# Patient Record
Sex: MALE | Race: WHITE
[De-identification: names, ages, dates, MRNs, and addresses within clinical notes are randomized per-mention and may not be internally consistent; named-entity substitution may affect disease eponyms.]

---

## 2021-01-08 ENCOUNTER — HOSPITAL ENCOUNTER (EMERGENCY)
Dept: HOSPITAL 56 - MW.ED | Age: 81
LOS: 1 days | Discharge: NURSING, REHAB OR LONG-TERM CARE | End: 2021-01-09
Payer: MEDICARE

## 2021-01-08 DIAGNOSIS — Z46.6: ICD-10-CM

## 2021-01-08 DIAGNOSIS — Z79.82: ICD-10-CM

## 2021-01-08 DIAGNOSIS — I48.91: ICD-10-CM

## 2021-01-08 DIAGNOSIS — R33.9: ICD-10-CM

## 2021-01-08 DIAGNOSIS — C49.9: ICD-10-CM

## 2021-01-08 DIAGNOSIS — A41.9: Primary | ICD-10-CM

## 2021-01-08 DIAGNOSIS — Z20.822: ICD-10-CM

## 2021-01-08 DIAGNOSIS — L03.114: ICD-10-CM

## 2021-01-08 DIAGNOSIS — I10: ICD-10-CM

## 2021-01-08 DIAGNOSIS — E88.09: ICD-10-CM

## 2021-01-08 LAB
BUN SERPL-MCNC: 47 MG/DL (ref 7–18)
CHLORIDE SERPL-SCNC: 108 MMOL/L (ref 98–107)
CO2 SERPL-SCNC: 27.1 MMOL/L (ref 21–32)
GLUCOSE SERPL-MCNC: 112 MG/DL (ref 74–106)
POTASSIUM SERPL-SCNC: 4.3 MMOL/L (ref 3.5–5.1)
SODIUM SERPL-SCNC: 145 MMOL/L (ref 136–148)

## 2021-01-08 PROCEDURE — 96365 THER/PROPH/DIAG IV INF INIT: CPT

## 2021-01-08 PROCEDURE — 96366 THER/PROPH/DIAG IV INF ADDON: CPT

## 2021-01-08 PROCEDURE — 83735 ASSAY OF MAGNESIUM: CPT

## 2021-01-08 PROCEDURE — 87040 BLOOD CULTURE FOR BACTERIA: CPT

## 2021-01-08 PROCEDURE — 96375 TX/PRO/DX INJ NEW DRUG ADDON: CPT

## 2021-01-08 PROCEDURE — 85025 COMPLETE CBC W/AUTO DIFF WBC: CPT

## 2021-01-08 PROCEDURE — 83605 ASSAY OF LACTIC ACID: CPT

## 2021-01-08 PROCEDURE — 51702 INSERT TEMP BLADDER CATH: CPT

## 2021-01-08 PROCEDURE — 81001 URINALYSIS AUTO W/SCOPE: CPT

## 2021-01-08 PROCEDURE — 96368 THER/DIAG CONCURRENT INF: CPT

## 2021-01-08 PROCEDURE — 71045 X-RAY EXAM CHEST 1 VIEW: CPT

## 2021-01-08 PROCEDURE — 84484 ASSAY OF TROPONIN QUANT: CPT

## 2021-01-08 PROCEDURE — 99285 EMERGENCY DEPT VISIT HI MDM: CPT

## 2021-01-08 PROCEDURE — 93005 ELECTROCARDIOGRAM TRACING: CPT

## 2021-01-08 PROCEDURE — U0002 COVID-19 LAB TEST NON-CDC: HCPCS

## 2021-01-08 PROCEDURE — 87077 CULTURE AEROBIC IDENTIFY: CPT

## 2021-01-08 PROCEDURE — 36415 COLL VENOUS BLD VENIPUNCTURE: CPT

## 2021-01-08 PROCEDURE — 85610 PROTHROMBIN TIME: CPT

## 2021-01-08 PROCEDURE — 87186 SC STD MICRODIL/AGAR DIL: CPT

## 2021-01-08 PROCEDURE — 80053 COMPREHEN METABOLIC PANEL: CPT

## 2021-01-08 PROCEDURE — 84443 ASSAY THYROID STIM HORMONE: CPT

## 2021-01-08 RX ADMIN — DIPHENHYDRAMINE HYDROCHLORIDE ONE ML: 12.5 SOLUTION ORAL at 21:25

## 2021-01-08 RX ADMIN — Medication PRN ML: at 21:58

## 2021-01-08 RX ADMIN — SODIUM CHLORIDE, PRESERVATIVE FREE PRN ML: 5 INJECTION INTRAVENOUS at 21:58

## 2021-01-08 RX ADMIN — DILTIAZEM HYDROCHLORIDE SCH MLS/HR: 100 INJECTION, POWDER, LYOPHILIZED, FOR SOLUTION INTRAVENOUS at 21:59

## 2021-01-08 RX ADMIN — DIPHENHYDRAMINE HYDROCHLORIDE ONE: 12.5 SOLUTION ORAL at 21:15

## 2021-01-08 NOTE — EDM.PDOC
ED HPI GENERAL MEDICAL PROBLEM





- General


Chief Complaint: Genitourinary Problem


Stated Complaint: UROLOGY


Time Seen by Provider: 01/08/21 19:45





- History of Present Illness


INITIAL COMMENTS - FREE TEXT/NARRATIVE: 





HISTORY AND PHYSICAL:





History of present illness:


This is an 80-year-old gentleman who was sent into the ER today for assistance 

with Vega catheter placement.  Patient reports that he lives at home with his 

wife who assist with caring for him.  Patient has diffuse anasarca resulting in 

significant edema of his uncircumcised foreskin of his penis.  Multiple attempts

were attempted by the nurse and the physician at his facility to place a Vega 

catheter unsuccessfully.  Patient was sent to the ER for assistance with Vega 

catheter placement.  Patient denies any other symptomatology.





Review of systems: 


As per history of present illness and below otherwise all systems reviewed and 

negative.





Past medical history: 


As per history of present illness and as reviewed below otherwise 

noncontributory.





Surgical history: 


As per history of present illness and as reviewed below otherwise 

noncontributory.





Social history: 


No reported history of drug or alcohol abuse.





Family history: 


As per history of present illness and as reviewed below otherwise 

noncontributory.





Physical exam:


Constitutional: Patient is oriented to person, place, and time.  Appears well-

developed and well-nourished.  No distress.


 HEENT: Dry mucous membranes


 Head: Normocephalic and atraumatic


 Eyes: Right eye exhibits no discharge.  Left eye exhibits no discharge.  No 

scleral icterus


 Neck: Normal range of motion.  No tracheal deviation present.


 Cardiovascular: Irregularly irregular, tachycardic


 Pulmonary: Effort normal, no respiratory distress.


 Abdominal: Large mass right flank, anasarca identified to umbilicus.  Patient 

was marked edema to his left upper extremity.


 Musculoskeletal: Patient with marked edema to his left upper extremity and 

bilateral lower extremities.  Patient with significant amount of erythema, war

mth to left upper extremity


 Neurologic: Alert and oriented to person, place and time.


 Skin: Pink, anasarca, cellulitis left upper extremity


 Psychiatric: Normal mood and affect.  Behavior is normal.  Judgment and thought

content normal.


 Nursing note and vital signs have been reviewed


As ER physical exam is significant for markedly edematous foreskin that is 

uncircumcised.  Patient's penis is retracted significantly within the edematous 

foreskin.  Unable to visualize the head of the meatus secondary to the edema and

difficulty to reduce the significant edema to his foreskin.








This patient was seen and evaluated during the 2020 SARS-CoV-2 novel coronavirus

pandemic period.  Community viral transmission is ongoing at time of this 

encounter and the emergency department is operating under pandemic response 

procedures.











Assessment and plan:


80-year-old gentleman presents ER today for Vega catheter placement that was 

difficult to place at his home.  Patient does have a significant amount of 

edema.  Multiple attempts were made with a blind catheterization secondary to 

the retracted penis and significant edema of his uncircumcised foreskin.  

Utilizing an anoscope to assist with visualization of his meatus, were able to 

identify the urethral opening.  Utilizing a stylette to assist with rigidity of 

the Vega catheter, a 22 [place] Vega catheter with a 10 cc balloon was easily 

inserted into his urethral opening.  A significant amount of dark yellow clear 

urine was obtained.  Urine was sent for urinalysis.  Vega bag was placed on the

catheter.  Tolerated procedure well.








Procedure:


Vega catheter placed utilizing stylette for rigidity and guidance into the 

urethral opening.  Patient tolerated procedure well.  No bleeding identified or 

trauma identified.








9 PM: Called to see patient secondary to patient currently in A. fib with RVR.  

Patient currently has a heart rate of 169 in the ED.  Patient's blood pressure 

is 90/45.  Patient reports he has no history significant for atrial 

fibrillation, rapid heart rates, irregular heart rates in the past.  Patient 

currently is not complaining of any chest pain or shortness of breath.  Patient 

denies any recent fevers, shakes, chills, nausea, vomiting, diarrhea.  Upon 

arrival to the ED, the patient's heart rate has been fluctuating between 75 and 

116 bpm.  In reviewing EMS records, the patient was slightly tachycardic in r

oute to the ED with no documentation of atrial fibrillation.





Patient currently appears to be in new onset A. fib with RVR after Vega 

catheter placement.  Patient's blood pressure is low, it appears that his 

pressure has been running in that same range even prior to his A. fib with RVR. 

We will initiate a Cardizem drip without a bolus given his low blood pressure.  

We have considered electrical cardioversion secondary to his low blood pressure 

and A. fib with RVR however patient currently is clinically asymptomatic and I 

do not feel that electrical cardioversion will be warranted at this time.  I 

think patient be best served with attempting chemical cardioversion with 

Cardizem initially and reevaluating if his blood pressure does not tolerate it.








Critical Care: The high probability of sudden, clinically significant 

deterioration in the patient's condition required the highest level of my 

preparedness to intervene urgently. The services I provided to this patient were

to treat and/or prevent clinically significant deterioration. Services included 

the following: chart data review, reviewing nursing notes and/or old charts, 

documentation time, consultant collaboration regarding findings and treatment 

options, medication orders and management, direct patient care, vital sign 

assessments and ordering, interpreting and reviewing diagnostic studies/lab 

tests. Aggregate critical care time includes only time during which I was 

engaged inwork directly related to the patient's care, as described above, 

whether at the bedside or elsewhere in the Emergency Department.  It did not in

clude time spent performing other reported procedures or the services of 

residents, students, nurses or physician assistants.














Critical Care Time:  35 minutes








12:09 AM: Addendum to patient's care secondary to complexity of patient's 

history.  I was able to contact [facility] to try to obtain some records.

 Apparently patient was admitted to [facility] on Wednesday for 

evaluation of his anasarca and evaluation for physical therapy/rehab placement. 

I was able to contact his PA, Rico Arteaga who was able to give me a significant 

amount of history on his patient.  History that is obtained from Rico Arteaga:





This is an 80-year-old gentleman with a history significant for leiomyosarcoma 

that was diagnosed many years ago and has been progressively growing over the 

last 5 years.  Patient is currently being treated palliatively with comfort 

measures.  Patient has been evaluated by multiple surgeons and no surgical 

intervention has been advised by any surgeon given his multiple comorbidities.  

Patient also has a history significant for anemia of chronic disease, anasarca, 

hypoalbuminemic state, acute MI, hypertension, CAD, squamous cell CA, who was 

admitted to [facility] on Wednesday, January 6 for social 

reasons.  Patient lives at home with his wife who is unable to care for him any 

further.  Patient is a somewhat large individual and she reports to adding that 

she is unable to transfer or care for him at home.  Patient was admitted to the 

hospital for further evaluation of his anasarca and to assist with some diuresis

and to assist with obtaining placement for physical therapy/rehab for the 

patient.  While at [facility], patient was given a small dose of

Lasix IV earlier today which resulted in no significant urinary output and a 

slight decrease in his blood pressure.  Patient's blood pressure is 85/60 upon 

arrival here in the ED.  Patient was transferred to [facility] for 

evaluation of Vega catheter placement.  It appears that the care provider as 

well as the nurses at [facility] tried multiple times unsuccessfully to 

place a Vega catheter in the patient.  At multiple times were unsuccessful 

secondary to a significant amount of anasarca and swelling of the patient's 

uncircumcised foreskin and with a markedly retracted penis.  According to Rico, 

patient was planned on receiving IV albumin today to assist with his anasarca 

and blood pressure.  According to Rico, patient's white blood cell count on 

arrival to the hospital was approximately 12,000.  Patient's WBC count currently

is greater than 20 K with a marked left shift.








Patient's records were reviewed and patient does not have a history notable for 

atrial fibrillation.  After placement of patient's Vega catheter, patient was 

noted to be in A. fib with RVR.  Patient's blood pressure has been low however 

according to abdomen his blood pressure was low upon transfer.  He does have a 

history of hypertension and his metoprolol was DC'd today.  In the ED, the 

patient was started on a Cardizem drip without a bolus.  Patient currently is in

sinus tachycardia with a heart rate of 120.  Patient does still go in and out of

A. fib in sinus rhythm while here in the ED.  It is unclear whether or not the 

patient's tachycardia is secondary to sepsis versus intravascular depletion 

secondary to the Lasix.  Patient will be given a 500 cc bolus of IV fluids to 

counteract the recent Lasix that was given to the patient.








While in the hospital, patient's PA, Rico Arteaga, had a long conversation with 

the patient and the patient wishes to be a full code, although when I have 

spoken to patient's wife over the phone and she feels patient should be a DNR.  

Patient currently is alert awake and orient x3 and had a conversation over the 

phone on speaker with him and his wife and patient has affirmed that he wants to

be a full code and wants CPR.  Patient does not appear to be confused and 

appears to have insight in his condition.  Patient was able to relay to me that 

CPR should for cardiopulmonary resuscitation.  At this time I will need to 

respect the patient's wishes and the patient will need to be admitted for more 

aggressive treatment.  When I have spoken to Rico Layton, he does not feel that

[facility] is an appropriate place given his multiple issues that are 

currently being diagnosed here in the ED.  As the patient is a full code, he 

will need higher level of care. 





Patient received vancomycin and Zosyn IV for broad-spectrum antibiotic for 

treatment of cellulitis and sepsis.





Patient be given a 500 cc bolus of NSS.


At this time, I feel that the 30 cc/kg of NSS for treatment of sepsis would be 

detrimental to this patient's health secondary to his severe anasarca, poor 

cardiac status.  Patient will be given a 500 cc bolus and reevaluated.





Case discussed with Dr. Brown at [facility].  Given that the 

patient is on a Cardizem drip, he will need ICU bed and they do not have any ICU

capabilities at this time.





Case was discussed with Dr. chou at [facility] and she has agreed to

assist this with inpatient level of care for this patient.








Definitive disposition and diagnosis as appropriate pending reevaluation and 

review of above.











- Related Data


                                    Allergies











Allergy/AdvReac Type Severity Reaction Status Date / Time


 


No Known Allergies Allergy   Verified 01/08/21 20:00











Home Meds: 


                                    Home Meds





Acetaminophen [Tylenol] 650 mg PO QID 01/09/21 [History]


Aspirin 81 mg PO DAILY 01/09/21 [History]


Metoprolol Succinate [Toprol XL] 12.5 mg PO DAILY 01/09/21 [History]


Multivitamin 1 each PO DAILY 01/09/21 [History]











ED ROS GENERAL





- Review of Systems


Review Of Systems: See Below





ED EXAM, GENERAL





- Physical Exam


Exam: See Below





Course





- Vital Signs


Last Recorded V/S: 


                                Last Vital Signs











Temp  98.9 F   01/08/21 19:45


 


Pulse  128 H  01/08/21 23:31


 


Resp  20   01/08/21 23:31


 


BP  88/55 L  01/08/21 23:31


 


Pulse Ox  94 L  01/08/21 23:31














- Orders/Labs/Meds


Orders: 


                               Active Orders 24 hr











 Category Date Time Status


 


 Cardiac Monitoring [RC] .AS DIRECTED Care  01/08/21 21:38 Active


 


 EKG Documentation Completion [RC] AM Care  01/08/21 22:30 Active


 


 EKG Documentation Completion [RC] STAT Care  01/08/21 21:36 Active


 


 Insert Vega Catheter [Insert Urinary Catheter] [OM.PC] Care  01/08/21 21:00 

Ordered





 Q24H   


 


 Pulse Oximetry [RC] ASDIRECTED Care  01/08/21 21:38 Active


 


 Urinary Catheter Assessment [RC] ASDIRECTED Care  01/08/21 20:54 Active


 


 CULTURE BLOOD [BC] Stat Lab  01/08/21 22:45 Received


 


 CULTURE BLOOD [BC] Stat Lab  01/08/21 22:55 Received


 


 Albumin Human 5% @ 125 MLS/HR x 1(250ml) Med  01/09/21 00:30 Ordered





 Albumin  5% [Buminate  5%] 250 ml   





 IV Q2H   


 


 Diltiazem [Cardizem] 100 mg Med  01/08/21 21:45 Active





 Sodium Chloride 0.9% [Normal Saline] 100 ml   





 IV NOW   


 


 Sodium Chloride 0.9% [Normal Saline] 1,000 ml Med  01/08/21 21:38 Active





 IV .Bolus   


 


 Sodium Chloride 0.9% [Saline Flush] Med  01/08/21 21:38 Active





 10 ml FLUSH ASDIRECTED PRN   


 


 Sodium Chloride 0.9% [Saline Flush] Med  01/08/21 21:38 Active





 2.5 ml FLUSH ASDIRECTED PRN   


 


 Blood Culture x2 Reflex Set [OM.PC] Stat Oth  01/08/21 22:30 Ordered


 


 Saline Lock Insert [OM.PC] Stat Oth  01/08/21 21:39 Ordered








                                Medication Orders





Sodium Chloride (Normal Saline)  1,000 mls @ 100 mls/hr IV .Bolus ONE


   Stop: 01/09/21 07:37


   Last Admin: 01/08/21 21:58  Dose: 100 mls/hr


   Documented by: MURDNIC


Diltiazem HCl 100 mg/ Sodium (Chloride)  100 mls @ 5 mls/hr IV NOW [facility]; Protocol


   Last Admin: 01/08/21 21:59  Dose: 5 mg/hr, 5 mls/hr


   Documented by: MURDNIC


Albumin Human (Buminate  5%)  250 mls @ 125 mls/hr IV Q2H TATIANNA


   Stop: 01/09/21 02:29


Sodium Chloride (Saline Flush)  10 ml FLUSH ASDIRECTED PRN


   PRN Reason: Keep Vein Open


   Last Admin: 01/08/21 21:58  Dose: 10 ml


   Documented by: TAVARES


Sodium Chloride (Saline Flush)  2.5 ml FLUSH ASDIRECTED PRN


   PRN Reason: Keep Vein Open


   Last Admin: 01/08/21 21:58  Dose: 2.5 ml


   Documented by: TAVARES








Labs: 


                                Laboratory Tests











  01/08/21 01/08/21 01/08/21 Range/Units





  20:30 21:49 21:49 


 


WBC   22.76 H   (4.0-11.0)  K/uL


 


RBC   3.53 L   (4.50-5.90)  M/uL


 


Hgb   8.7 L   (13.0-17.0)  g/dL


 


Hct   29.5 L   (38.0-50.0)  %


 


MCV   83.6   (80.0-98.0)  fL


 


MCH   24.6 L   (27.0-32.0)  pg


 


MCHC   29.5 L   (31.0-37.0)  g/dL


 


RDW Std Deviation   69.5 H   (28.0-62.0)  fl


 


RDW Coeff of Cyndi   23 H   (11.0-15.0)  %


 


Plt Count   223   (150-400)  K/uL


 


MPV   9.00   (7.40-12.00)  fL


 


Add Manual Diff   YES   


 


Neutrophils % (Manual)   88 H   (48.0-80.0)  %


 


Band Neutrophils %   3   %


 


Lymphocytes % (Manual)   5 L   (16.0-40.0)  %


 


Monocytes % (Manual)   4   (0.0-15.0)  %


 


Nucleated RBC %   0.0   /100WBC


 


Absolute Seg Neuts   20.0 H   (1.4-5.7)  


 


Band Neutrophils #   0.7   


 


Lymphocytes # (Manual)   1.1   (0.6-2.4)  


 


Monocytes # (Manual)   0.9 H   (0.0-0.8)  


 


Nucleated RBCs #   0   K/uL


 


Poikilocytosis   1+ SLIGHT   


 


INR    1.32  


 


Lactate     (0.20-2.00)  mmol/L


 


Sodium     (136-148)  mmol/L


 


Potassium     (3.5-5.1)  mmol/L


 


Chloride     ()  mmol/L


 


Carbon Dioxide     (21.0-32.0)  mmol/L


 


BUN     (7.0-18.0)  mg/dL


 


Creatinine     (0.8-1.3)  mg/dL


 


Est Cr Clr Drug Dosing     


 


Estimated GFR (MDRD)     ml/min


 


Glucose     ()  mg/dL


 


Calcium     (8.5-10.1)  mg/dL


 


Magnesium     (1.8-2.4)  mg/dL


 


Total Bilirubin     (0.2-1.0)  mg/dL


 


AST     (15-37)  IU/L


 


ALT     (14-63)  IU/L


 


Alkaline Phosphatase     ()  U/L


 


Troponin I     (0.000-0.056)  ng/mL


 


Total Protein     (6.4-8.2)  g/dL


 


Albumin     (3.4-5.0)  g/dL


 


Globulin     (2.6-4.0)  g/dL


 


Albumin/Globulin Ratio     (0.9-1.6)  


 


TSH 3rd Generation     (0.36-3.74)  uIU/mL


 


Urine Color  DARK YELLOW    


 


Urine Appearance  SLT CLOUDY    


 


Urine pH  5.0    (5.0-8.0)  


 


Ur Specific Gravity  1.025    (1.001-1.035)  


 


Urine Protein  TRACE H    (NEGATIVE)  mg/dL


 


Urine Glucose (UA)  NEGATIVE    (NEGATIVE)  mg/dL


 


Urine Ketones  TRACE H    (NEGATIVE)  mg/dL


 


Urine Occult Blood  TRACE-LYSED H    (NEGATIVE)  


 


Urine Nitrite  POSITIVE H    (NEGATIVE)  


 


Urine Bilirubin  MODERATE H    (NEGATIVE)  


 


Urine Ictotest  NEGATIVE    


 


Urine Urobilinogen  2.0 H    (<2.0)  EU/dL


 


Ur Leukocyte Esterase  TRACE H    (NEGATIVE)  


 


U Hyaline Cast (Auto)  1-4    (0-2/LPF)  


 


Urine RBC  0-2    (0-2/HPF)  


 


Urine WBC  0-3    (0-5/HPF)  


 


Ur Epithelial Cells  RARE    (NONE-FEW)  


 


Calcium Oxalate Crystal  RARE    (NEGATIVE)  


 


Amorphous Sediment  LIGHT    (NEGATIVE)  


 


Urine Bacteria  1+ H    (NEGATIVE)  


 


Urine Mucus  LIGHT    (NONE-MOD)  


 


SARS-CoV-2 RNA (JOEL)     (NEGATIVE)  














  01/08/21 01/08/21 01/08/21 Range/Units





  21:49 21:49 22:33 


 


WBC     (4.0-11.0)  K/uL


 


RBC     (4.50-5.90)  M/uL


 


Hgb     (13.0-17.0)  g/dL


 


Hct     (38.0-50.0)  %


 


MCV     (80.0-98.0)  fL


 


MCH     (27.0-32.0)  pg


 


MCHC     (31.0-37.0)  g/dL


 


RDW Std Deviation     (28.0-62.0)  fl


 


RDW Coeff of Cyndi     (11.0-15.0)  %


 


Plt Count     (150-400)  K/uL


 


MPV     (7.40-12.00)  fL


 


Add Manual Diff     


 


Neutrophils % (Manual)     (48.0-80.0)  %


 


Band Neutrophils %     %


 


Lymphocytes % (Manual)     (16.0-40.0)  %


 


Monocytes % (Manual)     (0.0-15.0)  %


 


Nucleated RBC %     /100WBC


 


Absolute Seg Neuts     (1.4-5.7)  


 


Band Neutrophils #     


 


Lymphocytes # (Manual)     (0.6-2.4)  


 


Monocytes # (Manual)     (0.0-0.8)  


 


Nucleated RBCs #     K/uL


 


Poikilocytosis     


 


INR     


 


Lactate   1.6   (0.20-2.00)  mmol/L


 


Sodium  145    (136-148)  mmol/L


 


Potassium  4.3    (3.5-5.1)  mmol/L


 


Chloride  108 H    ()  mmol/L


 


Carbon Dioxide  27.1    (21.0-32.0)  mmol/L


 


BUN  47 H    (7.0-18.0)  mg/dL


 


Creatinine  2.1 H    (0.8-1.3)  mg/dL


 


Est Cr Clr Drug Dosing  TNP    


 


Estimated GFR (MDRD)  30.5    ml/min


 


Glucose  112 H    ()  mg/dL


 


Calcium  8.5    (8.5-10.1)  mg/dL


 


Magnesium  1.9    (1.8-2.4)  mg/dL


 


Total Bilirubin  0.9    (0.2-1.0)  mg/dL


 


AST  54 H    (15-37)  IU/L


 


ALT  47    (14-63)  IU/L


 


Alkaline Phosphatase  89    ()  U/L


 


Troponin I  0.080 H*    (0.000-0.056)  ng/mL


 


Total Protein  5.3 L    (6.4-8.2)  g/dL


 


Albumin  1.5 L    (3.4-5.0)  g/dL


 


Globulin  3.8    (2.6-4.0)  g/dL


 


Albumin/Globulin Ratio  0.4 L    (0.9-1.6)  


 


TSH 3rd Generation  2.54    (0.36-3.74)  uIU/mL


 


Urine Color     


 


Urine Appearance     


 


Urine pH     (5.0-8.0)  


 


Ur Specific Gravity     (1.001-1.035)  


 


Urine Protein     (NEGATIVE)  mg/dL


 


Urine Glucose (UA)     (NEGATIVE)  mg/dL


 


Urine Ketones     (NEGATIVE)  mg/dL


 


Urine Occult Blood     (NEGATIVE)  


 


Urine Nitrite     (NEGATIVE)  


 


Urine Bilirubin     (NEGATIVE)  


 


Urine Ictotest     


 


Urine Urobilinogen     (<2.0)  EU/dL


 


Ur Leukocyte Esterase     (NEGATIVE)  


 


U Hyaline Cast (Auto)     (0-2/LPF)  


 


Urine RBC     (0-2/HPF)  


 


Urine WBC     (0-5/HPF)  


 


Ur Epithelial Cells     (NONE-FEW)  


 


Calcium Oxalate Crystal     (NEGATIVE)  


 


Amorphous Sediment     (NEGATIVE)  


 


Urine Bacteria     (NEGATIVE)  


 


Urine Mucus     (NONE-MOD)  


 


SARS-CoV-2 RNA (JOEL)    NEGATIVE  (NEGATIVE)  











Meds: 


Medications











Generic Name Dose Route Start Last Admin





  Trade Name Freq  PRN Reason Stop Dose Admin


 


Sodium Chloride  1,000 mls @ 100 mls/hr  01/08/21 21:38  01/08/21 21:58





  Normal Saline  IV  01/09/21 07:37  100 mls/hr





  .Bolus ONE   Administration


 


Diltiazem HCl 100 mg/ Sodium  100 mls @ 5 mls/hr  01/08/21 21:45  01/08/21 21:59





  Chloride  IV   5 mg/hr





  NOW TATIANNA   5 mls/hr





    Administration





  Protocol  





  5 MG/HR  


 


Albumin Human  250 mls @ 125 mls/hr  01/09/21 00:30 





  Buminate  5%  IV  01/09/21 02:29 





  Q2H TATIANNA  


 


Sodium Chloride  10 ml  01/08/21 21:38  01/08/21 21:58





  Saline Flush  FLUSH   10 ml





  ASDIRECTED PRN   Administration





  Keep Vein Open  


 


Sodium Chloride  2.5 ml  01/08/21 21:38  01/08/21 21:58





  Saline Flush  FLUSH   2.5 ml





  ASDIRECTED PRN   Administration





  Keep Vein Open  














Discontinued Medications














Generic Name Dose Route Start Last Admin





  Trade Name Freq  PRN Reason Stop Dose Admin


 


Acetaminophen  1,000 mg  01/08/21 23:47  01/08/21 23:56





  Tylenol Extra Strength  PO  01/08/21 23:48  1,000 mg





  ONETIME ONE   Administration


 


Digoxin  500 mcg  01/08/21 22:12  01/08/21 22:24





  Lanoxin  IVPUSH  01/08/21 22:13  500 mcg





  ONETIME ONE   Administration


 


Vancomycin HCl 1,500 mg/  100 mls @ 100 mls/hr  01/08/21 22:30  01/09/21 00:11





  Sodium Chloride  IV  01/08/21 23:29  Not Given





  ONETIME ONE  


 


Piperacillin Sod/Tazobactam  100 mls @ 100 mls/hr  01/08/21 22:30  01/08/21 

23:00





  Sod 4.5 gm/ Sodium Chloride  IV  01/08/21 23:29  100 mls/hr





  ONETIME ONE   Administration


 


Vancomycin HCl 1.5 gm/ Premix  300 mls @ 300 mls/hr  01/09/21 00:06  01/09/21 

00:14





  IV  01/09/21 00:07  300 mls/hr





  ONETIME ONE   Administration


 


Lidocaine HCl  Confirm  01/08/21 19:45  01/08/21 21:15





  Xylocaine 2% Viscous  Administered  01/08/21 19:46  Not Given





  Dose  





  15 ml  





  .ROUTE  





  .STK-MED ONE  


 


Lidocaine HCl  15 ml  01/08/21 20:53  01/08/21 21:25





  Xylocaine 2% Viscous  PO  01/08/21 20:54  15 ml





  ONETIME ONE   Administration














Departure





- Departure


Time of Disposition: 20:39


Disposition: DC/[facility]fer to [facility]


Condition: Poor


Clinical Impression: 


 Urinary retention, Encounter for Vega catheter fitting and adjustment, Atrial 

fibrillation with RVR, Sepsis, Left arm cellulitis, Hypotension, 

Hypoalbuminemia, Leiomyosarcoma








- Discharge Information


Instructions:  Indwelling Urinary Catheter Care, Adult


Referrals: 


Rico Arteaga PA [Primary Care Provider] - 


Forms:  ED Department Discharge


Additional Instructions: 


You were seen in treated in ER secondary to need for Vega catheter placement.  

A 20 [place] Vega catheter was placed with retrieval of clear yellow urine.  You

have been given instructions on how to take care of your Vega catheter and your

Vega catheter bag.  Please call your family physician for further instructions 

regarding care of your catheter and long-term plan.





The following information is given to patients seen in the emergency department 

who are being discharged to home. This information is to outline your options 

for follow-up care. We provide all patients seen in our emergency department 

with a follow-up referral.





The need for follow-up, as well as the timing and circumstances, are variable 

depending upon the specifics of your emergency department visit.





If you don't have a primary care physician on staff, we will provide you with a 

referral. We always advise you to contact your personal physician following an 

emergency department visit to inform them of the circumstance of the visit and 

for follow-up with them and/or the need for any referrals to a consulting 

specialist.





The emergency department will also refer you to a specialist when appropriate. 

This referral assures that you have the opportunity for follow-up care with a 

specialist. All of these measure are taken in an effort to provide you with 

optimal care, which includes your follow-up.





Under all circumstances we always encourage you to contact your private 

physician who remains a resource for coordinating your care. When calling for 

follow-up care, please make the office aware that this follow-up is from your 

recent emergency room visit. If for any reason you are refused follow-up, please

contact the [facility] Emergency Department

at [phone] and asked to speak to the emergency department charge nurse.





[facility]


[address] [place] [id number]


Phone: [phone]


Fax: [phone]








[address] [address]


Phone: [phone]


Fax: [phone]








Sepsis Event Note (ED)





- Evaluation


Sepsis Screening Result: No Definite Risk





- Focused Exam


Vital Signs: 


                                   Vital Signs











  Temp Pulse Pulse Resp BP Pulse Ox


 


 01/08/21 23:31    128 H  20  88/55 L  94 L


 


 01/08/21 22:37    111 H  20  79/58 L  96


 


 01/08/21 22:24   123 H    


 


 01/08/21 22:09    128 H  18  97/52 L  94 L


 


 01/08/21 21:09    186 H  20  75/48 L  96


 


 01/08/21 20:37    130 H  20  89/44 L  97


 


 01/08/21 19:50       96


 


 01/08/21 19:45  98.9 F   113 H  20  88/46 L  90 L














- My Orders


Last 24 Hours: 


My Active Orders





01/08/21 20:54


Urinary Catheter Assessment [RC] ASDIRECTED 





01/08/21 21:00


Insert Vega Catheter [Insert Urinary Catheter] [OM.PC] Q24H 





01/08/21 21:36


EKG Documentation Completion [RC] STAT 





01/08/21 21:38


Cardiac Monitoring [RC] .AS DIRECTED 


Pulse Oximetry [RC] ASDIRECTED 


Sodium Chloride 0.9% [Normal Saline] 1,000 ml IV .Bolus 


Sodium Chloride 0.9% [Saline Flush]   10 ml FLUSH ASDIRECTED PRN 


Sodium Chloride 0.9% [Saline Flush]   2.5 ml FLUSH ASDIRECTED PRN 





01/08/21 21:39


Saline Lock Insert [OM.PC] Stat 





01/08/21 21:45


Diltiazem [Cardizem] 100 mg   Sodium Chloride 0.9% [Normal Saline] 100 ml IV NOW







01/08/21 22:30


EKG Documentation Completion [RC] AM 


Blood Culture x2 Reflex Set [OM.PC] Stat 





01/08/21 22:45


CULTURE BLOOD [BC] Stat 





01/08/21 22:55


CULTURE BLOOD [BC] Stat 





01/09/21 00:30


Albumin Human 5% @ 125 MLS/HR x 1(250ml) Albumin  5% [Buminate  5%] 250 ml IV 

Q2H 














- Assessment/Plan


Last 24 Hours: 


My Active Orders





01/08/21 20:54


Urinary Catheter Assessment [RC] ASDIRECTED 





01/08/21 21:00


Insert Vega Catheter [Insert Urinary Catheter] [OM.PC] Q24H 





01/08/21 21:36


EKG Documentation Completion [RC] STAT 





01/08/21 21:38


Cardiac Monitoring [RC] .AS DIRECTED 


Pulse Oximetry [RC] ASDIRECTED 


Sodium Chloride 0.9% [Normal Saline] 1,000 ml IV .Bolus 


Sodium Chloride 0.9% [Saline Flush]   10 ml FLUSH ASDIRECTED PRN 


Sodium Chloride 0.9% [Saline Flush]   2.5 ml FLUSH ASDIRECTED PRN 





01/08/21 21:39


Saline Lock Insert [OM.PC] Stat 





01/08/21 21:45


Diltiazem [Cardizem] 100 mg   Sodium Chloride 0.9% [Normal Saline] 100 ml IV NOW







01/08/21 22:30


EKG Documentation Completion [RC] AM 


Blood Culture x2 Reflex Set [OM.PC] Stat 





01/08/21 22:45


CULTURE BLOOD [BC] Stat 





01/08/21 22:55


CULTURE BLOOD [BC] Stat 





01/09/21 00:30


Albumin Human 5% @ 125 MLS/HR x 1(250ml) Albumin  5% [Buminate  5%] 250 ml IV 

Q2H

## 2021-01-08 NOTE — CR
INDICATION:



 SVT. 



COMPARISON:



None.



FINDINGS/IMPRESSION:



 Portable AP chest radiograph. 



Mild cardiac prominence. Upper normal pulmonary vasculature. No pleural 

effusions. 



Patchy bilateral perihilar infiltrates, more prominent on the left than the 

right. Pneumonia is a consideration. 



Postoperative changes in the lower cervical spine. Inferior subluxation, 

and possible dislocation, of the right humeral head; clinical correlation 

is recommended and consider dedicated right shoulder radiographs if 

clinically indicated.



Dictated by Herman Arenas MD @ 1/8/2021 10:09:46 PM



Dictated by: Herman Arenas MD @ 01/08/2021 22:10:10



(Electronically Signed)

## 2021-01-09 RX ADMIN — ALBUMIN HUMAN SCH MLS/HR: 0.05 INJECTION, SOLUTION INTRAVENOUS at 01:03

## 2021-01-09 RX ADMIN — SODIUM CHLORIDE ONE: 9 INJECTION, SOLUTION INTRAVENOUS at 01:28

## 2021-01-09 RX ADMIN — SODIUM CHLORIDE ONE MG: 9 INJECTION, SOLUTION INTRAVENOUS at 01:09
